# Patient Record
Sex: MALE | Race: WHITE
[De-identification: names, ages, dates, MRNs, and addresses within clinical notes are randomized per-mention and may not be internally consistent; named-entity substitution may affect disease eponyms.]

---

## 2023-07-03 ENCOUNTER — HOSPITAL ENCOUNTER (EMERGENCY)
Age: 38
Discharge: HOME | End: 2023-07-03
Payer: COMMERCIAL

## 2023-07-03 VITALS — BODY MASS INDEX: 28.2 KG/M2

## 2023-07-03 VITALS
TEMPERATURE: 98 F | HEART RATE: 79 BPM | DIASTOLIC BLOOD PRESSURE: 84 MMHG | SYSTOLIC BLOOD PRESSURE: 132 MMHG | RESPIRATION RATE: 20 BRPM | OXYGEN SATURATION: 98 %

## 2023-07-03 DIAGNOSIS — M54.16: Primary | ICD-10-CM

## 2023-07-03 DIAGNOSIS — G89.29: ICD-10-CM

## 2023-07-03 DIAGNOSIS — Z79.899: ICD-10-CM

## 2023-07-03 DIAGNOSIS — M54.50: ICD-10-CM

## 2023-07-03 PROCEDURE — 96372 THER/PROPH/DIAG INJ SC/IM: CPT

## 2023-07-03 PROCEDURE — 99284 EMERGENCY DEPT VISIT MOD MDM: CPT

## 2023-07-03 PROCEDURE — 93971 EXTREMITY STUDY: CPT

## 2023-07-07 ENCOUNTER — HOSPITAL ENCOUNTER (EMERGENCY)
Age: 38
Discharge: HOME | End: 2023-07-07
Payer: COMMERCIAL

## 2023-07-07 VITALS
SYSTOLIC BLOOD PRESSURE: 118 MMHG | DIASTOLIC BLOOD PRESSURE: 76 MMHG | HEART RATE: 93 BPM | OXYGEN SATURATION: 98 % | TEMPERATURE: 98.3 F | RESPIRATION RATE: 18 BRPM

## 2023-07-07 VITALS — BODY MASS INDEX: 38.2 KG/M2

## 2023-07-07 DIAGNOSIS — Z79.4: ICD-10-CM

## 2023-07-07 DIAGNOSIS — M54.16: ICD-10-CM

## 2023-07-07 DIAGNOSIS — Z79.84: ICD-10-CM

## 2023-07-07 DIAGNOSIS — Z87.891: ICD-10-CM

## 2023-07-07 DIAGNOSIS — M54.41: Primary | ICD-10-CM

## 2023-07-07 DIAGNOSIS — Z79.899: ICD-10-CM

## 2023-07-07 PROCEDURE — 96372 THER/PROPH/DIAG INJ SC/IM: CPT

## 2023-07-07 PROCEDURE — 96374 THER/PROPH/DIAG INJ IV PUSH: CPT

## 2023-07-07 PROCEDURE — 99284 EMERGENCY DEPT VISIT MOD MDM: CPT

## 2023-10-11 ENCOUNTER — TELEPHONE (OUTPATIENT)
Dept: FAMILY MEDICINE CLINIC | Age: 38
End: 2023-10-11

## 2023-10-11 ENCOUNTER — OFFICE VISIT (OUTPATIENT)
Dept: FAMILY MEDICINE CLINIC | Age: 38
End: 2023-10-11
Payer: COMMERCIAL

## 2023-10-11 VITALS
DIASTOLIC BLOOD PRESSURE: 58 MMHG | OXYGEN SATURATION: 96 % | SYSTOLIC BLOOD PRESSURE: 100 MMHG | HEART RATE: 72 BPM | WEIGHT: 160.8 LBS | BODY MASS INDEX: 25.84 KG/M2 | HEIGHT: 66 IN | TEMPERATURE: 97.7 F

## 2023-10-11 DIAGNOSIS — M79.10 MYALGIA: ICD-10-CM

## 2023-10-11 DIAGNOSIS — R63.4 ABNORMAL WEIGHT LOSS: ICD-10-CM

## 2023-10-11 DIAGNOSIS — Z79.4 TYPE 2 DIABETES MELLITUS WITH DIABETIC POLYNEUROPATHY, WITH LONG-TERM CURRENT USE OF INSULIN (HCC): ICD-10-CM

## 2023-10-11 DIAGNOSIS — R53.1 GENERALIZED WEAKNESS: ICD-10-CM

## 2023-10-11 DIAGNOSIS — E11.42 TYPE 2 DIABETES MELLITUS WITH DIABETIC POLYNEUROPATHY, WITH LONG-TERM CURRENT USE OF INSULIN (HCC): Primary | ICD-10-CM

## 2023-10-11 DIAGNOSIS — Z79.4 TYPE 2 DIABETES MELLITUS WITH DIABETIC POLYNEUROPATHY, WITH LONG-TERM CURRENT USE OF INSULIN (HCC): Primary | ICD-10-CM

## 2023-10-11 DIAGNOSIS — E11.42 TYPE 2 DIABETES MELLITUS WITH DIABETIC POLYNEUROPATHY, WITH LONG-TERM CURRENT USE OF INSULIN (HCC): ICD-10-CM

## 2023-10-11 LAB
ALBUMIN SERPL-MCNC: 3.4 G/DL (ref 3.5–4.6)
ALP SERPL-CCNC: 116 U/L (ref 35–104)
ALT SERPL-CCNC: 47 U/L (ref 0–41)
ANION GAP SERPL CALCULATED.3IONS-SCNC: 14 MEQ/L (ref 9–15)
AST SERPL-CCNC: 45 U/L (ref 0–40)
B-OH-BUTYR SERPL-SCNC: 1.2 MG/DL (ref 0.2–2.8)
BASOPHILS # BLD: 0 K/UL (ref 0–0.2)
BASOPHILS NFR BLD: 0 %
BILIRUB SERPL-MCNC: 0.3 MG/DL (ref 0.2–0.7)
BUN SERPL-MCNC: 9 MG/DL (ref 6–20)
CALCIUM SERPL-MCNC: 8.2 MG/DL (ref 8.5–9.9)
CHLORIDE SERPL-SCNC: 96 MEQ/L (ref 95–107)
CK SERPL-CCNC: 226 U/L (ref 0–190)
CO2 SERPL-SCNC: 23 MEQ/L (ref 20–31)
CREAT SERPL-MCNC: 0.67 MG/DL (ref 0.7–1.2)
CREAT UR-MCNC: 72.7 MG/DL
CRP SERPL HS-MCNC: <3 MG/L (ref 0–5)
EOSINOPHIL # BLD: 0.1 K/UL (ref 0–0.7)
EOSINOPHIL NFR BLD: 2.6 %
ERYTHROCYTE [DISTWIDTH] IN BLOOD BY AUTOMATED COUNT: 12.6 % (ref 11.5–14.5)
ERYTHROCYTE [SEDIMENTATION RATE] IN BLOOD BY WESTERGREN METHOD: 2 MM (ref 0–10)
GLOBULIN SER CALC-MCNC: 2.5 G/DL (ref 2.3–3.5)
GLUCOSE SERPL-MCNC: 560 MG/DL (ref 70–99)
HBA1C MFR BLD: 14 %
HCT VFR BLD AUTO: 37.3 % (ref 42–52)
HGB BLD-MCNC: 12.4 G/DL (ref 14–18)
LDH SERPL-CCNC: 179 U/L (ref 135–225)
LYMPHOCYTES # BLD: 1.4 K/UL (ref 1–4.8)
LYMPHOCYTES NFR BLD: 25.1 %
MCH RBC QN AUTO: 28.9 PG (ref 27–31.3)
MCHC RBC AUTO-ENTMCNC: 33.2 % (ref 33–37)
MCV RBC AUTO: 86.9 FL (ref 79–92.2)
MICROALBUMIN UR-MCNC: <1.2 MG/DL
MICROALBUMIN/CREAT UR-RTO: NORMAL MG/G (ref 0–30)
MONOCYTES # BLD: 0.5 K/UL (ref 0.2–0.8)
MONOCYTES NFR BLD: 8.6 %
NEUTROPHILS # BLD: 3.5 K/UL (ref 1.4–6.5)
NEUTS SEG NFR BLD: 63.5 %
PLATELET # BLD AUTO: 183 K/UL (ref 130–400)
POTASSIUM SERPL-SCNC: 3.7 MEQ/L (ref 3.4–4.9)
PROT SERPL-MCNC: 5.9 G/DL (ref 6.3–8)
RBC # BLD AUTO: 4.29 M/UL (ref 4.7–6.1)
SODIUM SERPL-SCNC: 133 MEQ/L (ref 135–144)
TSH REFLEX: 0.73 UIU/ML (ref 0.44–3.86)
WBC # BLD AUTO: 5.5 K/UL (ref 4.8–10.8)

## 2023-10-11 PROCEDURE — 83036 HEMOGLOBIN GLYCOSYLATED A1C: CPT | Performed by: FAMILY MEDICINE

## 2023-10-11 PROCEDURE — 3046F HEMOGLOBIN A1C LEVEL >9.0%: CPT | Performed by: FAMILY MEDICINE

## 2023-10-11 PROCEDURE — 99204 OFFICE O/P NEW MOD 45 MIN: CPT | Performed by: FAMILY MEDICINE

## 2023-10-11 RX ORDER — TRAMADOL HYDROCHLORIDE 50 MG/1
50 TABLET ORAL EVERY 6 HOURS PRN
Qty: 28 TABLET | Refills: 0 | Status: SHIPPED | OUTPATIENT
Start: 2023-10-11 | End: 2023-10-18

## 2023-10-11 RX ORDER — PEN NEEDLE, DIABETIC 31 GX3/16"
NEEDLE, DISPOSABLE MISCELLANEOUS
COMMUNITY
Start: 2023-10-05

## 2023-10-11 RX ORDER — SEMAGLUTIDE 1.34 MG/ML
1 INJECTION, SOLUTION SUBCUTANEOUS
COMMUNITY
Start: 2019-11-18

## 2023-10-11 RX ORDER — ATORVASTATIN CALCIUM 40 MG/1
40 TABLET, FILM COATED ORAL
COMMUNITY
Start: 2022-10-06

## 2023-10-11 RX ORDER — GABAPENTIN 300 MG/1
CAPSULE ORAL
COMMUNITY
Start: 2023-09-12

## 2023-10-11 RX ORDER — INSULIN GLARGINE 100 [IU]/ML
INJECTION, SOLUTION SUBCUTANEOUS
COMMUNITY
Start: 2022-10-06

## 2023-10-11 RX ORDER — PROCHLORPERAZINE 25 MG/1
SUPPOSITORY RECTAL
COMMUNITY
Start: 2023-07-31

## 2023-10-11 RX ORDER — LISINOPRIL 5 MG/1
5 TABLET ORAL
COMMUNITY
Start: 2023-05-08

## 2023-10-11 RX ORDER — INSULIN ASPART 100 [IU]/ML
INJECTION, SOLUTION INTRAVENOUS; SUBCUTANEOUS
COMMUNITY
Start: 2023-09-22

## 2023-10-11 RX ORDER — PROCHLORPERAZINE 25 MG/1
SUPPOSITORY RECTAL
COMMUNITY
Start: 2023-09-14

## 2023-10-11 ASSESSMENT — ENCOUNTER SYMPTOMS
ABDOMINAL PAIN: 1
CONSTIPATION: 0
BACK PAIN: 1
BLOOD IN STOOL: 0
SHORTNESS OF BREATH: 0
CHEST TIGHTNESS: 0
RECTAL PAIN: 1

## 2023-10-11 NOTE — PROGRESS NOTES
[facility] DIABETES FOOT EXAM  -     Microalbumin / Creatinine Urine Ratio; Future  -     Lactic Acid; Future  -     Beta-Hydroxybutyrate; Future  2. Abnormal weight loss  -     TSH with Reflex; Future  -     CBC with Auto Differential; Future  -     Comprehensive Metabolic Panel; Future  -     Lactic Acid; Future  -     Lactate Dehydrogenase; Future  -     Sedimentation Rate; Future  -     C-Reactive Protein; Future  -     Beta-Hydroxybutyrate; Future  -     CT ABDOMEN PELVIS W IV CONTRAST Additional Contrast? Oral and Rectal; Future  3. Generalized weakness  -     TSH with Reflex; Future  -     CBC with Auto Differential; Future  -     Comprehensive Metabolic Panel; Future  -     Lactic Acid; Future  -     Lactate Dehydrogenase; Future  -     Sedimentation Rate; Future  -     C-Reactive Protein; Future  -     Beta-Hydroxybutyrate; Future  4. Myalgia  -     CK; Future  -     Aldolase; Future  -     traMADol (ULTRAM) 50 MG tablet; Take 1 tablet by mouth every 6 hours as needed for Pain for up to 7 days. Intended supply: 3 days. Take lowest dose possible to manage pain Max Daily Amount: 200 mg, Disp-28 tablet, R-0Normal  Looking at him there is certainly some concern for major medical issue. He does not want to go into the hospital.  I want him to stop his Lipitor and stop his metformin as I believe he is probably acidotic and there is a possibility of a myositis. I am going to have him split his Lantus to 20 units twice a day instead of 30 units daily. He will continue his lispro and his dose of Ozempic. If his pain gets worse or his urination gets worse he is to go to the ER immediately. His lactic acid or beta hydroxybutyrate are elevated I am going to send him to the hospital directly. Follow-up in 1 week.   Orders Placed This Encounter   Procedures    CT ABDOMEN PELVIS W IV CONTRAST Additional Contrast? Oral and Rectal     Standing Status:   Future     Standing Expiration Date:   10/11/2024     Order Specific

## 2023-10-12 LAB — ALDOLASE SERPL-CCNC: 5.9 U/L (ref 1.2–7.6)

## 2023-10-13 ENCOUNTER — TELEPHONE (OUTPATIENT)
Dept: FAMILY MEDICINE CLINIC | Age: 38
End: 2023-10-13

## 2023-10-13 NOTE — TELEPHONE ENCOUNTER
Amanda, on hipaa, states patient went to the ED (advised by pcp due to lab results) but was sent home after only receiving IV fluids. She is inquiring if he should be reevaluated.

## 2023-10-20 ENCOUNTER — OFFICE VISIT (OUTPATIENT)
Dept: FAMILY MEDICINE CLINIC | Age: 38
End: 2023-10-20
Payer: COMMERCIAL

## 2023-10-20 VITALS
WEIGHT: 177 LBS | SYSTOLIC BLOOD PRESSURE: 110 MMHG | DIASTOLIC BLOOD PRESSURE: 52 MMHG | BODY MASS INDEX: 28.45 KG/M2 | TEMPERATURE: 98.2 F | HEIGHT: 66 IN

## 2023-10-20 DIAGNOSIS — R63.4 ABNORMAL WEIGHT LOSS: ICD-10-CM

## 2023-10-20 DIAGNOSIS — Z79.4 TYPE 2 DIABETES MELLITUS WITH DIABETIC POLYNEUROPATHY, WITH LONG-TERM CURRENT USE OF INSULIN (HCC): ICD-10-CM

## 2023-10-20 DIAGNOSIS — E11.42 TYPE 2 DIABETES MELLITUS WITH DIABETIC POLYNEUROPATHY, WITH LONG-TERM CURRENT USE OF INSULIN (HCC): ICD-10-CM

## 2023-10-20 DIAGNOSIS — M79.10 MYALGIA: Primary | ICD-10-CM

## 2023-10-20 DIAGNOSIS — R53.1 GENERALIZED WEAKNESS: ICD-10-CM

## 2023-10-20 PROCEDURE — 3046F HEMOGLOBIN A1C LEVEL >9.0%: CPT | Performed by: FAMILY MEDICINE

## 2023-10-20 PROCEDURE — 99214 OFFICE O/P EST MOD 30 MIN: CPT | Performed by: FAMILY MEDICINE

## 2023-10-20 PROCEDURE — 96372 THER/PROPH/DIAG INJ SC/IM: CPT | Performed by: FAMILY MEDICINE

## 2023-10-20 RX ORDER — KETOROLAC TROMETHAMINE 30 MG/ML
60 INJECTION, SOLUTION INTRAMUSCULAR; INTRAVENOUS ONCE
Status: COMPLETED | OUTPATIENT
Start: 2023-10-20 | End: 2023-10-20

## 2023-10-20 RX ORDER — BACLOFEN 10 MG/1
10 TABLET ORAL 3 TIMES DAILY
Qty: 30 TABLET | Refills: 1 | Status: SHIPPED | OUTPATIENT
Start: 2023-10-20

## 2023-10-20 RX ADMIN — KETOROLAC TROMETHAMINE 60 MG: 30 INJECTION, SOLUTION INTRAMUSCULAR; INTRAVENOUS at 11:26

## 2023-10-20 SDOH — ECONOMIC STABILITY: FOOD INSECURITY: WITHIN THE PAST 12 MONTHS, THE FOOD YOU BOUGHT JUST DIDN'T LAST AND YOU DIDN'T HAVE MONEY TO GET MORE.: NEVER TRUE

## 2023-10-20 SDOH — ECONOMIC STABILITY: INCOME INSECURITY: HOW HARD IS IT FOR YOU TO PAY FOR THE VERY BASICS LIKE FOOD, HOUSING, MEDICAL CARE, AND HEATING?: NOT HARD AT ALL

## 2023-10-20 SDOH — ECONOMIC STABILITY: HOUSING INSECURITY
IN THE LAST 12 MONTHS, WAS THERE A TIME WHEN YOU DID NOT HAVE A STEADY PLACE TO SLEEP OR SLEPT IN A SHELTER (INCLUDING NOW)?: NO

## 2023-10-20 SDOH — ECONOMIC STABILITY: FOOD INSECURITY: WITHIN THE PAST 12 MONTHS, YOU WORRIED THAT YOUR FOOD WOULD RUN OUT BEFORE YOU GOT MONEY TO BUY MORE.: NEVER TRUE

## 2023-10-20 ASSESSMENT — PATIENT HEALTH QUESTIONNAIRE - PHQ9
SUM OF ALL RESPONSES TO PHQ QUESTIONS 1-9: 0
SUM OF ALL RESPONSES TO PHQ QUESTIONS 1-9: 0
2. FEELING DOWN, DEPRESSED OR HOPELESS: 0
SUM OF ALL RESPONSES TO PHQ9 QUESTIONS 1 & 2: 0
SUM OF ALL RESPONSES TO PHQ QUESTIONS 1-9: 0
SUM OF ALL RESPONSES TO PHQ QUESTIONS 1-9: 0
1. LITTLE INTEREST OR PLEASURE IN DOING THINGS: 0

## 2023-10-20 NOTE — PROGRESS NOTES
normal.      Palpations: Abdomen is soft. Musculoskeletal:         General: Normal range of motion. Cervical back: Normal range of motion and neck supple. Skin:     General: Skin is warm and dry. Neurological:      General: No focal deficit present. Mental Status: He is alert. Psychiatric:         Mood and Affect: Mood normal.         Behavior: Behavior normal.         Thought Content: Thought content normal.         Judgment: Judgment normal.       Assessment & Plan   1. Myalgia  -     TOBI Screen With Reflex; Future  -     CK; Future  -     baclofen (LIORESAL) 10 MG tablet; Take 1 tablet by mouth 3 times daily, Disp-30 tablet, R-1Normal  2. Generalized weakness  -     TOBI Screen With Reflex; Future  -     CK; Future  3. Type 2 diabetes mellitus with diabetic polyneuropathy, with long-term current use of insulin ([address])  4. Abnormal weight loss  Think the CT scan of the abdomen as well. Repeat his CK levels to see if they have returned to normal after stopping the statin. Going to increase his gabapentin to 3 times a day. We will add baclofen to see if we get him a little pain relief. If his CK levels are elevated I am going to send him to neurology to proceed with further work-up. The etiology of his pain is unclear to me at this point in time similarly the weight loss which could be due to to the uncontrolled blood sugars but will continue close follow-up. Orders Placed This Encounter   Procedures    TOBI Screen With Reflex     Standing Status:   Future     Number of Occurrences:   1     Standing Expiration Date:   10/20/2024    CK     Standing Status:   Future     Number of Occurrences:   1     Standing Expiration Date:   10/20/2024     Orders Placed This Encounter   Medications    baclofen (LIORESAL) 10 MG tablet     Sig: Take 1 tablet by mouth 3 times daily     Dispense:  30 tablet     Refill:  1     There are no discontinued medications.   Return in about 1 week (around

## 2023-10-26 ENCOUNTER — TELEMEDICINE (OUTPATIENT)
Dept: FAMILY MEDICINE CLINIC | Age: 38
End: 2023-10-26
Payer: COMMERCIAL

## 2023-10-26 DIAGNOSIS — M79.10 MYALGIA: ICD-10-CM

## 2023-10-26 DIAGNOSIS — R74.8 ABNORMAL CK: ICD-10-CM

## 2023-10-26 DIAGNOSIS — R76.8 ELEVATED ANTINUCLEAR ANTIBODY (ANA) LEVEL: Primary | ICD-10-CM

## 2023-10-26 PROCEDURE — 99423 OL DIG E/M SVC 21+ MIN: CPT | Performed by: FAMILY MEDICINE

## 2023-10-26 RX ORDER — PREGABALIN 50 MG/1
50 CAPSULE ORAL 3 TIMES DAILY
Qty: 90 CAPSULE | Refills: 0 | Status: SHIPPED | OUTPATIENT
Start: 2023-10-26 | End: 2023-11-25

## 2023-10-26 NOTE — PROGRESS NOTES
Veronica Austin, was evaluated through a synchronous (real-time) audio-video encounter. The patient (or guardian if applicable) is aware that this is a billable service, which includes applicable co-pays. This Virtual Visit was conducted with patient's (and/or legal guardian's) consent. Patient identification was verified, and a caregiver was present when appropriate. The patient was located at Home: [address] [address]  Provider was located at [address] [place] [address] ([address] [place] [address]): Ki Reece (:  1985) is a Established patient, presenting virtually for evaluation of the following:    Assessment & Plan   Below is the assessment and plan developed based on review of pertinent history, physical exam, labs, studies, and medications. 1. Elevated antinuclear antibody (TOBI) level  -     pregabalin (LYRICA) 50 MG capsule; Take 1 capsule by mouth 3 times daily for 30 days. Max Daily Amount: 150 mg, Disp-90 capsule, R-0Normal  -     Amb External Referral To Rheumatology  2. Myalgia  -     pregabalin (LYRICA) 50 MG capsule; Take 1 capsule by mouth 3 times daily for 30 days. Max Daily Amount: 150 mg, Disp-90 capsule, R-0Normal  3. Abnormal CK  -     pregabalin (LYRICA) 50 MG capsule; Take 1 capsule by mouth 3 times daily for 30 days. Max Daily Amount: 150 mg, Disp-90 capsule, R-0Normal  -     Amb External Referral To Rheumatology    Return in about 4 weeks (around 2023). Subjective   HPI  Review of Systems   He has not noticed much change. The diffuse pain is unchanged. The baclofen did not help at all. Tramadol was not effective. Labs reviewed and his CKs have gone up. His TOBI was borderline positive. His blood sugars are getting better with the insulin changes. He is having more pain in his low back and down his leg but the total muscle pain is just as bad.     Objective   Patient-Reported Vitals  No data recorded     Physical Exam  [INSTRUCTIONS:  \"[x]\" Indicates a positive item

## 2023-11-17 DIAGNOSIS — M79.10 MYALGIA: ICD-10-CM

## 2023-11-17 RX ORDER — BACLOFEN 10 MG/1
10 TABLET ORAL 3 TIMES DAILY
Qty: 30 TABLET | Refills: 1 | Status: SHIPPED | OUTPATIENT
Start: 2023-11-17

## 2023-11-17 NOTE — TELEPHONE ENCOUNTER
Future Appointments    This patient does not currently have any appointments scheduled.   Past Visits    Date Provider Specialty Visit Type Primary Dx   10/26/2023 Ramon Cannon DO Family Medicine Telemedicine Elevated antinuclear antibody (TOBI) level

## 2023-12-13 DIAGNOSIS — M79.10 MYALGIA: ICD-10-CM

## 2023-12-13 RX ORDER — BACLOFEN 10 MG/1
10 TABLET ORAL 3 TIMES DAILY
Qty: 30 TABLET | Refills: 1 | Status: SHIPPED | OUTPATIENT
Start: 2023-12-13

## 2023-12-13 NOTE — TELEPHONE ENCOUNTER
Future Appointments    This patient does not currently have any appointments scheduled.   Past Visits    Date Provider Specialty Visit Type Primary Dx   10/26/2023 Divina Lopez DO Family Medicine Telemedicine Elevated antinuclear antibody (TOBI) level

## 2024-01-05 RX ORDER — INSULIN GLARGINE 100 [IU]/ML
INJECTION, SOLUTION SUBCUTANEOUS
Qty: 5 ADJUSTABLE DOSE PRE-FILLED PEN SYRINGE | Refills: 1 | Status: SHIPPED | OUTPATIENT
Start: 2024-01-05

## 2024-01-05 NOTE — TELEPHONE ENCOUNTER
Future Appointments    This patient does not currently have any appointments scheduled.  Past Visits    Date Provider Specialty Visit Type Primary Dx   10/26/2023 Momo Molina, DO Family Medicine Telemedicine Elevated antinuclear antibody (TOBI) level

## 2024-01-06 DIAGNOSIS — M79.10 MYALGIA: ICD-10-CM

## 2024-01-08 RX ORDER — BACLOFEN 10 MG/1
10 TABLET ORAL 3 TIMES DAILY
Qty: 30 TABLET | Refills: 1 | Status: SHIPPED | OUTPATIENT
Start: 2024-01-08

## 2024-02-01 ENCOUNTER — OFFICE VISIT (OUTPATIENT)
Dept: NEUROSURGERY | Facility: CLINIC | Age: 39
End: 2024-02-01
Payer: COMMERCIAL

## 2024-02-01 VITALS
SYSTOLIC BLOOD PRESSURE: 148 MMHG | BODY MASS INDEX: 28.93 KG/M2 | HEIGHT: 66 IN | RESPIRATION RATE: 20 BRPM | HEART RATE: 84 BPM | WEIGHT: 180 LBS | DIASTOLIC BLOOD PRESSURE: 87 MMHG

## 2024-02-01 DIAGNOSIS — M51.36 DDD (DEGENERATIVE DISC DISEASE), LUMBAR: Primary | ICD-10-CM

## 2024-02-01 DIAGNOSIS — M79.10 MYALGIA: ICD-10-CM

## 2024-02-01 PROCEDURE — 1036F TOBACCO NON-USER: CPT | Performed by: NEUROLOGICAL SURGERY

## 2024-02-01 PROCEDURE — 99214 OFFICE O/P EST MOD 30 MIN: CPT | Performed by: NEUROLOGICAL SURGERY

## 2024-02-01 PROCEDURE — 99204 OFFICE O/P NEW MOD 45 MIN: CPT | Performed by: NEUROLOGICAL SURGERY

## 2024-02-01 RX ORDER — LISINOPRIL 5 MG/1
5 TABLET ORAL
COMMUNITY
Start: 2023-05-08

## 2024-02-01 RX ORDER — NAPROXEN 500 MG/1
TABLET ORAL
COMMUNITY

## 2024-02-01 RX ORDER — ALOGLIPTIN 25 MG/1
25 TABLET, FILM COATED ORAL
COMMUNITY
Start: 2017-10-31

## 2024-02-01 RX ORDER — SEMAGLUTIDE 1.34 MG/ML
INJECTION, SOLUTION SUBCUTANEOUS
COMMUNITY
Start: 2023-06-06

## 2024-02-01 RX ORDER — GABAPENTIN 600 MG/1
TABLET ORAL
COMMUNITY
Start: 2023-12-30

## 2024-02-01 RX ORDER — BACLOFEN 10 MG/1
10 TABLET ORAL 3 TIMES DAILY
Qty: 30 TABLET | Refills: 1 | Status: SHIPPED | OUTPATIENT
Start: 2024-02-01

## 2024-02-01 RX ORDER — BLOOD-GLUCOSE SENSOR
EACH MISCELLANEOUS
COMMUNITY
Start: 2023-07-31

## 2024-02-01 ASSESSMENT — PAIN SCALES - GENERAL: PAINLEVEL: 8

## 2024-02-01 NOTE — PROGRESS NOTES
It was a pleasure to see Mr. Blackwood at the Neurosurgery Spine Clinic at [facility].     He is a really nice 38 y.o. male  who presents to us with complaints LOW BACK PAIN radiating to RIGHT LEG that started about  7 months ago, and have been gradually worsening since that time.  The symptoms started after no known injury    The pain is 10 /10. The pain is described as aching and occurs all day.  Symptoms are exacerbated by nothing in particular. Factors which relieve the pain include nothing      Numbness and/or tingling - YES    Weakness : YES    Bladder/Bowel symptoms - NO    The patient has tried medications (eg: gabapentin, NSAIDS and narcotics ) : Yes  PT : Yes    Date: last was last year  Pain Management with ESIs/selective nerve blocks  - NO    he is a NON-SMOKER and DIABETIC    History of Depression : NO    PRIOR SPINE SURGERY: NO    Denies use of Aspirin, Coumadin, or Plavix or any other anticoagulants     NARCOTICS for pain : YES    Part of this patient’s history is from personal review of the patient’s previous charts.      History reviewed. No pertinent past medical history.        Current Outpatient Medications:     alogliptin (Nesina) 25 mg tablet, 1 tablet (25 mg)., Disp: , Rfl:     Dexcom G6 Sensor device, use as directed CHANGE EVERY 10 DAYS, Disp: , Rfl:     gabapentin (Neurontin) 600 mg tablet, , Disp: , Rfl:     lisinopril 5 mg tablet, Take 1 tablet (5 mg) by mouth., Disp: , Rfl:     Ozempic 1 mg/dose (4 mg/3 mL) pen injector, Inject under the skin., Disp: , Rfl:     naproxen (Naprosyn) 500 mg tablet, take 1 tablet by mouth twice a day if needed pain, Disp: , Rfl:       Review of Systems :   Constitutional: No fever or chills  Respiratory: No shortness of breath or wheezing  Musculoskeletal: see HPI above   Neurologic: See HPI above        EXAM:   Vitals:    02/01/24 1042   BP: 148/87   Pulse: 84   Resp: 20     NEURO: Neurologically patient is awake alert and oriented X 3    No obvious  cranial nerve deficit.  Strength is almost 5/5 throughout all motor groups tested with no asymmetric significant motor deficit.  Deep tendon reflexes are symmetric throughout.   Gait : Antalgic.  Sensory examination is intact to touch and painful stimuli.  SLR negative      IMAGING:   MRI of the lumbar spine was reviewed personally and shows presence of slight loss of disc height at L4-5 with a broad-based protrusion resulting in lateral recess and moderate to medial neural foraminal stenosis     ASSESSMENT AND PLAN:  Maldonado Blackwood is a 38 y.o. male presents to us with a few months of symptoms of pain involving mainly the medial part of the thigh that are hemolyzed there is in the groin near his testes and radiates that involve mostly the thigh region.  He does have some pain involving the back and in the lower extremities that could be suggestive of right-sided L4/L5 radiculopathy given the fact that he does have mild to moderate stenosis involving the lateral recess and medial foramen at L4-5 but overall he seems to be significantly more symptomatic with the pain involving the groin medial thigh that does not seems to be explained by the right L4-5 lateral recess stenosis that he has and could be unrelated to the finding of the MRI of the spine and may be arising from some other pathology involving the groin region or abdomen.  He denies any bowel bladder incontinence.  He has tried physical therapy with no relief.  I discussed with him the finding of his MRI and do not recommend any surgical intervention given the lack of correlation between the symptoms that seems to be present mainly in the medial aspect of his thigh and groin that does not correlate very well with the right L4-5 stenosis and it is very likely that he does have some pain from the right L4-5 stenosis but his major symptom that he seems to be worried about the loss may be unrelated to the findings of the spine MRI.  Discussed with him though  discuss symptoms with his primary Dr. Stephon Barragan and see if he would benefit from some further workup to rule out any other etiology of his ongoing symptoms.   Regarding history symptoms of radiculopathy that seems to be also present even though not significantly worse than the medial thigh symptoms I believe we will benefit from continued physical therapy and pain management evaluation and treatment but the patient is adamantly not interested in any procedures or injections due to some family members not doing well with the same whatsoever.  Regardless I do not recommend any surgical intervention at this point unless there is absolutely no other etiology for his ongoing symptoms that could be explained by any further evaluation of the abdomen or urogenital system.    It was a pleasure to participate in Mr. Blackwood clinical care. All questions were answered to him satisfaction and he explained understanding of the further treatment plan.         Omer Bates MD, [facility]   of Neurological Surgery  [employer]  Attending Surgeon  Director - Minimally Invasive Spine Surgery  [place], [place]      Some of this note was completed using Dragon voice recognition technology and sometimes the software misinterprets words. This may include unintended errors with respect to translation of words, typographical errors or grammar errors which may not have been identified prior to finalization of the chart note. Please take this into account when reading this note

## 2024-02-08 ENCOUNTER — OFFICE VISIT (OUTPATIENT)
Dept: FAMILY MEDICINE CLINIC | Age: 39
End: 2024-02-08
Payer: COMMERCIAL

## 2024-02-08 VITALS
DIASTOLIC BLOOD PRESSURE: 90 MMHG | BODY MASS INDEX: 31.05 KG/M2 | SYSTOLIC BLOOD PRESSURE: 144 MMHG | HEIGHT: 66 IN | OXYGEN SATURATION: 97 % | TEMPERATURE: 98.7 F | HEART RATE: 81 BPM | WEIGHT: 193.2 LBS

## 2024-02-08 DIAGNOSIS — E10.65 UNCONTROLLED TYPE 1 DIABETES MELLITUS WITH HYPERGLYCEMIA, WITH LONG-TERM CURRENT USE OF INSULIN (HCC): ICD-10-CM

## 2024-02-08 DIAGNOSIS — M79.10 MYALGIA: ICD-10-CM

## 2024-02-08 DIAGNOSIS — R76.8 POSITIVE ANA (ANTINUCLEAR ANTIBODY): ICD-10-CM

## 2024-02-08 DIAGNOSIS — G47.01 INSOMNIA DUE TO MEDICAL CONDITION: Primary | ICD-10-CM

## 2024-02-08 LAB
ALBUMIN SERPL-MCNC: 4.2 G/DL (ref 3.5–4.6)
ALP SERPL-CCNC: 125 U/L (ref 35–104)
ALT SERPL-CCNC: 25 U/L (ref 0–41)
ANION GAP SERPL CALCULATED.3IONS-SCNC: 9 MEQ/L (ref 9–15)
AST SERPL-CCNC: 25 U/L (ref 0–40)
BILIRUB SERPL-MCNC: 0.3 MG/DL (ref 0.2–0.7)
BUN SERPL-MCNC: 8 MG/DL (ref 6–20)
CALCIUM SERPL-MCNC: 9.2 MG/DL (ref 8.5–9.9)
CHLORIDE SERPL-SCNC: 105 MEQ/L (ref 95–107)
CO2 SERPL-SCNC: 26 MEQ/L (ref 20–31)
CREAT SERPL-MCNC: 0.68 MG/DL (ref 0.7–1.2)
GLOBULIN SER CALC-MCNC: 2.9 G/DL (ref 2.3–3.5)
GLUCOSE SERPL-MCNC: 99 MG/DL (ref 70–99)
HBA1C MFR BLD: 13.1 %
POTASSIUM SERPL-SCNC: 4.1 MEQ/L (ref 3.4–4.9)
PROT SERPL-MCNC: 7.1 G/DL (ref 6.3–8)
SODIUM SERPL-SCNC: 140 MEQ/L (ref 135–144)

## 2024-02-08 PROCEDURE — G8427 DOCREV CUR MEDS BY ELIG CLIN: HCPCS | Performed by: FAMILY MEDICINE

## 2024-02-08 PROCEDURE — G8417 CALC BMI ABV UP PARAM F/U: HCPCS | Performed by: FAMILY MEDICINE

## 2024-02-08 PROCEDURE — 3046F HEMOGLOBIN A1C LEVEL >9.0%: CPT | Performed by: FAMILY MEDICINE

## 2024-02-08 PROCEDURE — 99214 OFFICE O/P EST MOD 30 MIN: CPT | Performed by: FAMILY MEDICINE

## 2024-02-08 PROCEDURE — 1036F TOBACCO NON-USER: CPT | Performed by: FAMILY MEDICINE

## 2024-02-08 PROCEDURE — 2022F DILAT RTA XM EVC RTNOPTHY: CPT | Performed by: FAMILY MEDICINE

## 2024-02-08 PROCEDURE — G8484 FLU IMMUNIZE NO ADMIN: HCPCS | Performed by: FAMILY MEDICINE

## 2024-02-08 PROCEDURE — 83036 HEMOGLOBIN GLYCOSYLATED A1C: CPT | Performed by: FAMILY MEDICINE

## 2024-02-08 RX ORDER — INSULIN LISPRO 100 [IU]/ML
12 INJECTION, SOLUTION INTRAVENOUS; SUBCUTANEOUS 4 TIMES DAILY
Qty: 3 ADJUSTABLE DOSE PRE-FILLED PEN SYRINGE | Refills: 3 | Status: SHIPPED | OUTPATIENT
Start: 2024-02-08

## 2024-02-08 RX ORDER — CLONIDINE HYDROCHLORIDE 0.1 MG/1
0.1 TABLET ORAL
Qty: 30 TABLET | Refills: 3 | Status: SHIPPED | OUTPATIENT
Start: 2024-02-08

## 2024-02-08 RX ORDER — DOXEPIN HYDROCHLORIDE 25 MG/1
25 CAPSULE ORAL NIGHTLY
Qty: 30 CAPSULE | Refills: 3 | Status: SHIPPED | OUTPATIENT
Start: 2024-02-08

## 2024-02-08 ASSESSMENT — PATIENT HEALTH QUESTIONNAIRE - PHQ9
SUM OF ALL RESPONSES TO PHQ QUESTIONS 1-9: 0
2. FEELING DOWN, DEPRESSED OR HOPELESS: 0
1. LITTLE INTEREST OR PLEASURE IN DOING THINGS: 0
SUM OF ALL RESPONSES TO PHQ9 QUESTIONS 1 & 2: 0
SUM OF ALL RESPONSES TO PHQ QUESTIONS 1-9: 0

## 2024-02-08 NOTE — PROGRESS NOTES
Subjective  Kevin Garza 1985 is a 38 y.o. male who presents today with:  Chief Complaint   Patient presents with    Abdominal Pain     States for the past 2 months, he has been having generalized abdominal pain, stomach cramping, bloating, and excessive gas after injecting himself with Novolog. Currently using the Novolog 3-4 times daily. Symptoms start about 5-10 minutes after he injects. Denies nausea, heartburn, vomiting, or diarrhea. Blood sugars running between 180-225.     Insomnia     C/O difficulty with both falling and staying asleep. Currently estimates that he is getting about 2-3 hours of sleep nightly. He does wake up gasping for air in the middle of night. His wife states he snores as well. States he is extremely fatigued at all hours of the day. He has had a previous sleep study 12-13 years ago at [facility]. He has tried taking Melatonin and Tylenol PM without effectiveness.        HPI  I have reviewed HPI and agree with above.  He was in the ER at [facility] towards the end of January.  He was in DKA but left AGAINST MEDICAL ADVICE.  He continues to feel horribly.  His diabetes is very poorly controlled compliance is an issue.  He is unable to take the NovoLog due to stomach cramping and excessive gas.  Today's A1c is 13.    Review of Systems   All other systems reviewed and are negative.      History reviewed. No pertinent past medical history.  History reviewed. No pertinent surgical history.  Social History     Socioeconomic History    Marital status: Single     Spouse name: Not on file    Number of children: Not on file    Years of education: Not on file    Highest education level: Not on file   Occupational History    Not on file   Tobacco Use    Smoking status: Never    Smokeless tobacco: Never   Vaping Use    Vaping Use: Never used   Substance and Sexual Activity    Alcohol use: Never    Drug use: Never    Sexual activity: Not on file   Other Topics Concern    Not on file   Social

## 2024-02-21 ENCOUNTER — OFFICE VISIT (OUTPATIENT)
Dept: FAMILY MEDICINE CLINIC | Age: 39
End: 2024-02-21
Payer: COMMERCIAL

## 2024-02-21 VITALS
HEART RATE: 69 BPM | OXYGEN SATURATION: 98 % | BODY MASS INDEX: 32.6 KG/M2 | DIASTOLIC BLOOD PRESSURE: 68 MMHG | TEMPERATURE: 97.8 F | WEIGHT: 202 LBS | SYSTOLIC BLOOD PRESSURE: 114 MMHG

## 2024-02-21 DIAGNOSIS — G47.01 INSOMNIA DUE TO MEDICAL CONDITION: ICD-10-CM

## 2024-02-21 DIAGNOSIS — R11.0 NAUSEA: ICD-10-CM

## 2024-02-21 DIAGNOSIS — T50.905A ADVERSE EFFECT OF DRUG, INITIAL ENCOUNTER: Primary | ICD-10-CM

## 2024-02-21 DIAGNOSIS — L29.9 ITCHING: ICD-10-CM

## 2024-02-21 PROCEDURE — G8484 FLU IMMUNIZE NO ADMIN: HCPCS | Performed by: FAMILY MEDICINE

## 2024-02-21 PROCEDURE — 96372 THER/PROPH/DIAG INJ SC/IM: CPT | Performed by: FAMILY MEDICINE

## 2024-02-21 PROCEDURE — G8417 CALC BMI ABV UP PARAM F/U: HCPCS | Performed by: FAMILY MEDICINE

## 2024-02-21 PROCEDURE — 1036F TOBACCO NON-USER: CPT | Performed by: FAMILY MEDICINE

## 2024-02-21 PROCEDURE — 99214 OFFICE O/P EST MOD 30 MIN: CPT | Performed by: FAMILY MEDICINE

## 2024-02-21 PROCEDURE — G8427 DOCREV CUR MEDS BY ELIG CLIN: HCPCS | Performed by: FAMILY MEDICINE

## 2024-02-21 RX ORDER — DEXAMETHASONE SODIUM PHOSPHATE 10 MG/ML
10 INJECTION INTRAMUSCULAR; INTRAVENOUS ONCE
Status: COMPLETED | OUTPATIENT
Start: 2024-02-21 | End: 2024-02-21

## 2024-02-21 RX ORDER — QUETIAPINE FUMARATE 50 MG/1
50 TABLET, FILM COATED ORAL 2 TIMES DAILY
Qty: 60 TABLET | Refills: 3 | Status: SHIPPED | OUTPATIENT
Start: 2024-02-21 | End: 2024-02-21 | Stop reason: SDUPTHER

## 2024-02-21 RX ORDER — PROMETHAZINE HYDROCHLORIDE 25 MG/ML
25 INJECTION, SOLUTION INTRAMUSCULAR; INTRAVENOUS ONCE
Status: COMPLETED | OUTPATIENT
Start: 2024-02-21 | End: 2024-02-21

## 2024-02-21 RX ORDER — HYDROXYZINE HYDROCHLORIDE 25 MG/1
TABLET, FILM COATED ORAL
COMMUNITY
Start: 2024-02-19

## 2024-02-21 RX ORDER — PREDNISONE 20 MG/1
TABLET ORAL
COMMUNITY
Start: 2024-02-19

## 2024-02-21 RX ORDER — QUETIAPINE FUMARATE 50 MG/1
50 TABLET, FILM COATED ORAL NIGHTLY
Qty: 30 TABLET | Refills: 2 | Status: SHIPPED | OUTPATIENT
Start: 2024-02-21

## 2024-02-21 RX ADMIN — PROMETHAZINE HYDROCHLORIDE 25 MG: 25 INJECTION, SOLUTION INTRAMUSCULAR; INTRAVENOUS at 09:15

## 2024-02-21 RX ADMIN — DEXAMETHASONE SODIUM PHOSPHATE 10 MG: 10 INJECTION INTRAMUSCULAR; INTRAVENOUS at 09:15

## 2024-02-21 NOTE — PROGRESS NOTES
Mental Status: He is alert.   Psychiatric:         Mood and Affect: Mood normal.         Behavior: Behavior normal.         Thought Content: Thought content normal.         Judgment: Judgment normal.     Assessment & Plan   1. Adverse effect of drug, initial encounter-I suspect this is a reaction to the doxepin.  I am going to stop both the doxepin and the clonidine.  He will finish the prednisone again given Decadron here in the office.      Something happened to the note.  Documentation was lost.  Orders were placed for 2 injections.  The new prescription was sent.  He has close follow-up.

## 2024-03-03 DIAGNOSIS — E10.65 UNCONTROLLED TYPE 1 DIABETES MELLITUS WITH HYPERGLYCEMIA, WITH LONG-TERM CURRENT USE OF INSULIN (HCC): ICD-10-CM

## 2024-03-04 RX ORDER — INSULIN LISPRO 100 [IU]/ML
INJECTION, SOLUTION INTRAVENOUS; SUBCUTANEOUS
Qty: 3 ADJUSTABLE DOSE PRE-FILLED PEN SYRINGE | Refills: 3 | Status: SHIPPED | OUTPATIENT
Start: 2024-03-04

## 2024-03-15 DIAGNOSIS — F41.9 ANXIETY: Primary | ICD-10-CM

## 2024-04-04 ENCOUNTER — OFFICE VISIT (OUTPATIENT)
Dept: FAMILY MEDICINE CLINIC | Age: 39
End: 2024-04-04
Payer: COMMERCIAL

## 2024-04-04 VITALS
SYSTOLIC BLOOD PRESSURE: 136 MMHG | OXYGEN SATURATION: 96 % | TEMPERATURE: 97.8 F | DIASTOLIC BLOOD PRESSURE: 80 MMHG | BODY MASS INDEX: 34.01 KG/M2 | HEIGHT: 66 IN | HEART RATE: 96 BPM | WEIGHT: 211.6 LBS

## 2024-04-04 DIAGNOSIS — M79.10 MYALGIA: ICD-10-CM

## 2024-04-04 DIAGNOSIS — M54.17 LUMBOSACRAL RADICULOPATHY: ICD-10-CM

## 2024-04-04 DIAGNOSIS — E10.65 UNCONTROLLED TYPE 1 DIABETES MELLITUS WITH HYPERGLYCEMIA, WITH LONG-TERM CURRENT USE OF INSULIN (HCC): Primary | ICD-10-CM

## 2024-04-04 DIAGNOSIS — G89.29 CHRONIC NEUROPATHIC PAIN: ICD-10-CM

## 2024-04-04 DIAGNOSIS — M79.2 CHRONIC NEUROPATHIC PAIN: ICD-10-CM

## 2024-04-04 DIAGNOSIS — R74.8 ABNORMAL CK: ICD-10-CM

## 2024-04-04 DIAGNOSIS — G47.01 INSOMNIA DUE TO MEDICAL CONDITION: ICD-10-CM

## 2024-04-04 DIAGNOSIS — R76.8 ELEVATED ANTINUCLEAR ANTIBODY (ANA) LEVEL: ICD-10-CM

## 2024-04-04 DIAGNOSIS — G25.81 RESTLESS LEGS: ICD-10-CM

## 2024-04-04 PROCEDURE — 2022F DILAT RTA XM EVC RTNOPTHY: CPT | Performed by: FAMILY MEDICINE

## 2024-04-04 PROCEDURE — G8417 CALC BMI ABV UP PARAM F/U: HCPCS | Performed by: FAMILY MEDICINE

## 2024-04-04 PROCEDURE — 1036F TOBACCO NON-USER: CPT | Performed by: FAMILY MEDICINE

## 2024-04-04 PROCEDURE — G8427 DOCREV CUR MEDS BY ELIG CLIN: HCPCS | Performed by: FAMILY MEDICINE

## 2024-04-04 PROCEDURE — 3046F HEMOGLOBIN A1C LEVEL >9.0%: CPT | Performed by: FAMILY MEDICINE

## 2024-04-04 PROCEDURE — 99214 OFFICE O/P EST MOD 30 MIN: CPT | Performed by: FAMILY MEDICINE

## 2024-04-04 RX ORDER — PREGABALIN 100 MG/1
100 CAPSULE ORAL 3 TIMES DAILY
Qty: 90 CAPSULE | Refills: 1 | Status: SHIPPED | OUTPATIENT
Start: 2024-04-04 | End: 2024-06-03

## 2024-04-04 RX ORDER — QUETIAPINE FUMARATE 300 MG/1
300 TABLET, FILM COATED ORAL NIGHTLY
Qty: 30 TABLET | Refills: 2 | Status: SHIPPED | OUTPATIENT
Start: 2024-04-04

## 2024-04-04 RX ORDER — ROPINIROLE 2 MG/1
2 TABLET, FILM COATED ORAL 3 TIMES DAILY
Qty: 90 TABLET | Refills: 3 | Status: SHIPPED | OUTPATIENT
Start: 2024-04-04

## 2024-04-04 RX ORDER — INSULIN GLARGINE 100 [IU]/ML
INJECTION, SOLUTION SUBCUTANEOUS
Qty: 5 ADJUSTABLE DOSE PRE-FILLED PEN SYRINGE | Refills: 1 | Status: SHIPPED | OUTPATIENT
Start: 2024-04-04

## 2024-04-04 NOTE — PROGRESS NOTES
Subjective  Kevin Garza 1985 is a 39 y.o. male who presents today with:  Chief Complaint   Patient presents with    Depression     C/O depression and anxiety since getting out of senior care. He is scheduled to see Eden De La Cruz CNP at the end of the month. States he is constantly feeling nervous as he does not know if he is going back to senior care. Does not want to do anything - no motivation, some of this stemming from the pain in his legs/feet.     Leg Pain     C/O numb and tingling pain in his legs and feet bilaterally. Blood sugars have been over 300 during the day and 170 at night before bed. He has not been making good diet choices. He is scheduled to see rheumatology at the end of the month.        Depression  Leg Pain       I have reviewed HPI and agree with above.  The numbness and the tingling make it difficult for him to control his leg movements.  He is not able to sleep.  He has a lot of life space stress going on.  As it turns out the only thing that corrects the pain is when he takes Percocet.  He has been taking Percocet tens 3 times a day that he is gone from other sources.  He states when he takes this he has no pain at all and is able to function normally.    He continues to not eat well.  His postmeal blood sugars are in the 300s.  He did have a discussion with someone about a pump but his C-peptide was still too high.    Review of Systems   Psychiatric/Behavioral:  Positive for depression.    All other systems reviewed and are negative.      History reviewed. No pertinent past medical history.  History reviewed. No pertinent surgical history.  Social History     Socioeconomic History    Marital status: Single     Spouse name: Not on file    Number of children: Not on file    Years of education: Not on file    Highest education level: Not on file   Occupational History    Not on file   Tobacco Use    Smoking status: Never    Smokeless tobacco: Never   Vaping Use    Vaping Use: Never used

## 2024-04-08 DIAGNOSIS — E10.65 UNCONTROLLED TYPE 1 DIABETES MELLITUS WITH HYPERGLYCEMIA, WITH LONG-TERM CURRENT USE OF INSULIN (HCC): ICD-10-CM

## 2024-04-08 RX ORDER — INSULIN LISPRO 100 [IU]/ML
INJECTION, SOLUTION INTRAVENOUS; SUBCUTANEOUS
Qty: 3 ML | Refills: 5 | Status: SHIPPED | OUTPATIENT
Start: 2024-04-08

## 2024-04-11 ENCOUNTER — HOSPITAL ENCOUNTER (EMERGENCY)
Age: 39
Discharge: HOME | End: 2024-04-11
Payer: COMMERCIAL

## 2024-04-11 VITALS
SYSTOLIC BLOOD PRESSURE: 144 MMHG | DIASTOLIC BLOOD PRESSURE: 100 MMHG | TEMPERATURE: 98.24 F | OXYGEN SATURATION: 96 % | HEART RATE: 91 BPM

## 2024-04-11 VITALS — HEART RATE: 74 BPM | DIASTOLIC BLOOD PRESSURE: 100 MMHG | SYSTOLIC BLOOD PRESSURE: 170 MMHG | OXYGEN SATURATION: 94 %

## 2024-04-11 VITALS — BODY MASS INDEX: 32 KG/M2

## 2024-04-11 DIAGNOSIS — S09.8XXA: ICD-10-CM

## 2024-04-11 DIAGNOSIS — Z79.899: ICD-10-CM

## 2024-04-11 DIAGNOSIS — Z87.891: ICD-10-CM

## 2024-04-11 DIAGNOSIS — Z79.84: ICD-10-CM
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
## 2024-04-11 DIAGNOSIS — Z79.4: ICD-10-CM

## 2024-04-11 DIAGNOSIS — R51.9: Primary | ICD-10-CM

## 2024-04-11 PROCEDURE — 70450 CT HEAD/BRAIN W/O DYE: CPT

## 2024-04-11 PROCEDURE — 99284 EMERGENCY DEPT VISIT MOD MDM: CPT

## 2024-04-15 ENCOUNTER — TELEPHONE (OUTPATIENT)
Dept: DIABETES SERVICES | Age: 39
End: 2024-04-15

## 2024-05-11 DIAGNOSIS — G47.01 INSOMNIA DUE TO MEDICAL CONDITION: ICD-10-CM

## 2024-05-11 NOTE — TELEPHONE ENCOUNTER
Pharmacy requesting medication refill. Please approve or deny this request.    Rx requested:  Requested Prescriptions     Pending Prescriptions Disp Refills    QUEtiapine (SEROQUEL) 50 MG tablet [Pharmacy Med Name: QUETIAPINE FUMARATE 50 MG TAB] 30 tablet 2     Sig: take 1 tablet by mouth at bedtime         Last Office Visit:   4/4/2024      Next Visit Date:  No future appointments.

## 2024-05-13 RX ORDER — QUETIAPINE FUMARATE 50 MG/1
50 TABLET, FILM COATED ORAL NIGHTLY
Qty: 30 TABLET | Refills: 2 | Status: SHIPPED | OUTPATIENT
Start: 2024-05-13

## 2024-06-05 ENCOUNTER — OFFICE VISIT (OUTPATIENT)
Dept: FAMILY MEDICINE CLINIC | Age: 39
End: 2024-06-05
Payer: COMMERCIAL

## 2024-06-05 VITALS
SYSTOLIC BLOOD PRESSURE: 126 MMHG | WEIGHT: 211 LBS | TEMPERATURE: 97.9 F | OXYGEN SATURATION: 96 % | HEIGHT: 66 IN | BODY MASS INDEX: 33.91 KG/M2 | HEART RATE: 75 BPM | DIASTOLIC BLOOD PRESSURE: 82 MMHG

## 2024-06-05 DIAGNOSIS — E10.65 UNCONTROLLED TYPE 1 DIABETES MELLITUS WITH HYPERGLYCEMIA, WITH LONG-TERM CURRENT USE OF INSULIN (HCC): ICD-10-CM

## 2024-06-05 PROCEDURE — 99213 OFFICE O/P EST LOW 20 MIN: CPT | Performed by: FAMILY MEDICINE

## 2024-06-05 PROCEDURE — 2022F DILAT RTA XM EVC RTNOPTHY: CPT | Performed by: FAMILY MEDICINE

## 2024-06-05 PROCEDURE — 3046F HEMOGLOBIN A1C LEVEL >9.0%: CPT | Performed by: FAMILY MEDICINE

## 2024-06-05 PROCEDURE — G8427 DOCREV CUR MEDS BY ELIG CLIN: HCPCS | Performed by: FAMILY MEDICINE

## 2024-06-05 PROCEDURE — 1036F TOBACCO NON-USER: CPT | Performed by: FAMILY MEDICINE

## 2024-06-05 PROCEDURE — G8417 CALC BMI ABV UP PARAM F/U: HCPCS | Performed by: FAMILY MEDICINE

## 2024-06-05 RX ORDER — INSULIN LISPRO 100 [IU]/ML
INJECTION, SOLUTION INTRAVENOUS; SUBCUTANEOUS
Qty: 3 ML | Refills: 5 | Status: SHIPPED | OUTPATIENT
Start: 2024-06-05

## 2024-06-05 RX ORDER — LUMATEPERONE 42 MG/1
42 CAPSULE ORAL EVERY EVENING
COMMUNITY
Start: 2024-05-16

## 2024-06-05 RX ORDER — INSULIN GLARGINE 100 [IU]/ML
INJECTION, SOLUTION SUBCUTANEOUS
Qty: 5 ADJUSTABLE DOSE PRE-FILLED PEN SYRINGE | Refills: 1 | Status: SHIPPED | OUTPATIENT
Start: 2024-06-05

## 2024-06-05 RX ORDER — TRAZODONE HYDROCHLORIDE 100 MG/1
TABLET ORAL
COMMUNITY
Start: 2024-05-15

## 2024-06-05 RX ORDER — DOXEPIN HYDROCHLORIDE 25 MG/1
CAPSULE ORAL
COMMUNITY
Start: 2024-05-15

## 2024-06-05 RX ORDER — LAMOTRIGINE 25 MG/1
TABLET ORAL
COMMUNITY
Start: 2024-04-30

## 2024-06-05 NOTE — PROGRESS NOTES
Subjective  Kevin Garza 1985 is a 39 y.o. male who presents today with:  Chief Complaint   Patient presents with    Diabetes     He monitors his blood sugars at home 3-4x daily. He reports his blood sugars average about 160, but have been as high as 500. Due for DM eye exam. He had previously seen Dr. Lyle to manage his DM, but he is not planning on returning to that office. Currently taking Humalog and Lantus as directed without adverse effects. C/O bilateral foot pain, frequent urination and excessive thirst. He does take gabapentin daily.     Hypertension       He does not monitor his BP at home. Denies chest pains, palpitations, dizziness, lightheadedness, headaches or edema. BP today in the office was 100/58. C/O SOB. Currently taking Lisinopril 5 mg once daily, as directed.        Diabetes    Hypertension      I have reviewed HPI and agree with above.     Review of Systems   All other systems reviewed and are negative.      History reviewed. No pertinent past medical history.  History reviewed. No pertinent surgical history.  Social History     Socioeconomic History    Marital status: Single     Spouse name: Not on file    Number of children: Not on file    Years of education: Not on file    Highest education level: Not on file   Occupational History    Not on file   Tobacco Use    Smoking status: Never    Smokeless tobacco: Never   Vaping Use    Vaping Use: Never used   Substance and Sexual Activity    Alcohol use: Never    Drug use: Never    Sexual activity: Not on file   Other Topics Concern    Not on file   Social History Narrative    Not on file     Social Determinants of Health     Financial Resource Strain: Low Risk  (10/20/2023)    Overall Financial Resource Strain (CARDIA)     Difficulty of Paying Living Expenses: Not hard at all   Food Insecurity: Not on file (10/20/2023)   Transportation Needs: Unknown (10/20/2023)    PRAPARE - Transportation     Lack of Transportation (Medical): Not

## 2024-07-13 DIAGNOSIS — G47.01 INSOMNIA DUE TO MEDICAL CONDITION: ICD-10-CM

## 2024-07-15 RX ORDER — QUETIAPINE FUMARATE 300 MG/1
300 TABLET, FILM COATED ORAL NIGHTLY
Qty: 90 TABLET | Refills: 1 | Status: SHIPPED | OUTPATIENT
Start: 2024-07-15

## 2024-07-15 RX ORDER — QUETIAPINE FUMARATE 50 MG/1
50 TABLET, FILM COATED ORAL NIGHTLY
Qty: 90 TABLET | Refills: 1 | Status: SHIPPED | OUTPATIENT
Start: 2024-07-15

## 2024-07-26 DIAGNOSIS — E10.65 UNCONTROLLED TYPE 1 DIABETES MELLITUS WITH HYPERGLYCEMIA, WITH LONG-TERM CURRENT USE OF INSULIN (HCC): ICD-10-CM

## 2024-07-29 RX ORDER — INSULIN GLARGINE 100 [IU]/ML
INJECTION, SOLUTION SUBCUTANEOUS
Qty: 15 ML | Refills: 1 | Status: SHIPPED | OUTPATIENT
Start: 2024-07-29

## 2024-07-29 NOTE — TELEPHONE ENCOUNTER
Pharmacy requesting medication refill. Please approve or deny this request.    Rx requested:  Requested Prescriptions     Pending Prescriptions Disp Refills    insulin glargine (BASAGLAR KWIKPEN) 100 UNIT/ML injection pen [Pharmacy Med Name: BASAGLAR 100 UNIT/ML KWIKPEN] 15 mL 1     Sig: INJECT 30 UNITS SUBCUTANEOUSLY TWICE A DAY         Last Office Visit:   6/5/2024      Next Visit Date:  Future Appointments   Date Time Provider Department Center   12/5/2024 11:00 AM Momo Molina, DO [facility] Meaghan Marcelino

## 2024-09-16 DIAGNOSIS — E10.65 UNCONTROLLED TYPE 1 DIABETES MELLITUS WITH HYPERGLYCEMIA, WITH LONG-TERM CURRENT USE OF INSULIN (HCC): ICD-10-CM

## 2024-09-16 RX ORDER — INSULIN LISPRO 100 [IU]/ML
INJECTION, SOLUTION INTRAVENOUS; SUBCUTANEOUS
Qty: 12 ML | Refills: 2 | Status: SHIPPED | OUTPATIENT
Start: 2024-09-16

## 2024-09-30 DIAGNOSIS — E10.65 UNCONTROLLED TYPE 1 DIABETES MELLITUS WITH HYPERGLYCEMIA, WITH LONG-TERM CURRENT USE OF INSULIN (HCC): ICD-10-CM

## 2024-09-30 RX ORDER — INSULIN GLARGINE 100 [IU]/ML
INJECTION, SOLUTION SUBCUTANEOUS
Qty: 15 ML | Refills: 1 | Status: SHIPPED | OUTPATIENT
Start: 2024-09-30

## 2024-09-30 NOTE — TELEPHONE ENCOUNTER
Requesting medication refill. Please approve or deny this request.    Rx requested:  Requested Prescriptions     Pending Prescriptions Disp Refills    insulin glargine (BASAGLAR KWIKPEN) 100 UNIT/ML injection pen 15 mL 1     Sig: INJECT 30 UNITS SUBCUTANEOUSLY TWICE A DAY         Last Office Visit:   6/5/2024      Next Visit Date:  Future Appointments   Date Time Provider Department Center   12/5/2024 11:00 AM Momo Molina DO [facility]

## 2024-10-02 ENCOUNTER — TELEPHONE (OUTPATIENT)
Dept: FAMILY MEDICINE CLINIC | Age: 39
End: 2024-10-02

## 2024-10-02 NOTE — TELEPHONE ENCOUNTER
Wife is calling in and stating that the pt's sugar is reading #382 at this moment.  Patient did take insulin this am but theres a chance that he picked up the wrong pen and used the wrong dose-per wife)  Asked how the patient was feeling any side effects and the only thing he would say is that he was \"off\"  He wanted to use ER.  Spoke with Arianna and was advised to use ER  Wife is taking patient to ER    KASIE

## 2024-12-09 DIAGNOSIS — E10.65 UNCONTROLLED TYPE 1 DIABETES MELLITUS WITH HYPERGLYCEMIA, WITH LONG-TERM CURRENT USE OF INSULIN (HCC): ICD-10-CM

## 2024-12-09 RX ORDER — INSULIN LISPRO 100 [IU]/ML
INJECTION, SOLUTION INTRAVENOUS; SUBCUTANEOUS
Qty: 12 ML | Refills: 2 | Status: SHIPPED | OUTPATIENT
Start: 2024-12-09

## 2024-12-10 ENCOUNTER — TELEPHONE (OUTPATIENT)
Dept: FAMILY MEDICINE CLINIC | Age: 39
End: 2024-12-10

## 2024-12-10 NOTE — TELEPHONE ENCOUNTER
Prior authorization approved  Payer: Asiya from [facility] Exchange Case ID: [id number]  Note from payer: Approved. This drug has been approved. Approved quantity: 12 units per 21 day(s). The drug has been approved from 12/10/2024 to 12/10/2025. Please call the pharmacy to process your prescription claim. Generic or biosimilar substitution may be required when available and preferred on the formulary.  Approval Details    Authorized from December 10, 2024 to December 10, 2025

## 2024-12-16 ENCOUNTER — OFFICE VISIT (OUTPATIENT)
Age: 39
End: 2024-12-16
Payer: COMMERCIAL

## 2024-12-16 VITALS
OXYGEN SATURATION: 96 % | TEMPERATURE: 98 F | SYSTOLIC BLOOD PRESSURE: 122 MMHG | HEART RATE: 76 BPM | DIASTOLIC BLOOD PRESSURE: 70 MMHG | WEIGHT: 234.8 LBS | BODY MASS INDEX: 37.73 KG/M2 | HEIGHT: 66 IN

## 2024-12-16 DIAGNOSIS — G47.01 INSOMNIA DUE TO MEDICAL CONDITION: ICD-10-CM

## 2024-12-16 DIAGNOSIS — G25.81 RESTLESS LEGS: ICD-10-CM

## 2024-12-16 DIAGNOSIS — E10.65 UNCONTROLLED TYPE 1 DIABETES MELLITUS WITH HYPERGLYCEMIA, WITH LONG-TERM CURRENT USE OF INSULIN (HCC): ICD-10-CM

## 2024-12-16 DIAGNOSIS — Z79.4 TYPE 2 DIABETES MELLITUS WITH DIABETIC POLYNEUROPATHY, WITH LONG-TERM CURRENT USE OF INSULIN (HCC): Primary | ICD-10-CM

## 2024-12-16 DIAGNOSIS — E11.42 TYPE 2 DIABETES MELLITUS WITH DIABETIC POLYNEUROPATHY, WITH LONG-TERM CURRENT USE OF INSULIN (HCC): Primary | ICD-10-CM

## 2024-12-16 LAB — HBA1C MFR BLD: 8.3 %

## 2024-12-16 PROCEDURE — 83036 HEMOGLOBIN GLYCOSYLATED A1C: CPT | Performed by: FAMILY MEDICINE

## 2024-12-16 PROCEDURE — 99213 OFFICE O/P EST LOW 20 MIN: CPT | Performed by: FAMILY MEDICINE

## 2024-12-16 RX ORDER — ROPINIROLE 2 MG/1
2 TABLET, FILM COATED ORAL 3 TIMES DAILY
Qty: 90 TABLET | Refills: 3 | Status: SHIPPED | OUTPATIENT
Start: 2024-12-16 | End: 2024-12-16

## 2024-12-16 RX ORDER — PEN NEEDLE, DIABETIC 31 GX3/16"
1 NEEDLE, DISPOSABLE MISCELLANEOUS 3 TIMES DAILY
Qty: 100 EACH | Refills: 3 | Status: SHIPPED | OUTPATIENT
Start: 2024-12-16

## 2024-12-16 RX ORDER — INSULIN LISPRO 100 [IU]/ML
INJECTION, SOLUTION INTRAVENOUS; SUBCUTANEOUS
Qty: 12 ML | Refills: 2 | Status: SHIPPED | OUTPATIENT
Start: 2024-12-16

## 2024-12-16 RX ORDER — PROCHLORPERAZINE 25 MG/1
1 SUPPOSITORY RECTAL DAILY
Qty: 1 EACH | Refills: 3 | Status: SHIPPED | OUTPATIENT
Start: 2024-12-16

## 2024-12-16 RX ORDER — QUETIAPINE FUMARATE 50 MG/1
50 TABLET, FILM COATED ORAL NIGHTLY
Qty: 90 TABLET | Refills: 1 | Status: SHIPPED | OUTPATIENT
Start: 2024-12-16

## 2024-12-16 RX ORDER — QUETIAPINE FUMARATE 300 MG/1
300 TABLET, FILM COATED ORAL NIGHTLY
Qty: 90 TABLET | Refills: 1 | Status: SHIPPED | OUTPATIENT
Start: 2024-12-16

## 2024-12-16 RX ORDER — PROCHLORPERAZINE 25 MG/1
4 SUPPOSITORY RECTAL WEEKLY
Qty: 4 EACH | Refills: 5 | Status: SHIPPED | OUTPATIENT
Start: 2024-12-16

## 2024-12-16 RX ORDER — INSULIN GLARGINE 100 [IU]/ML
INJECTION, SOLUTION SUBCUTANEOUS
Qty: 15 ML | Refills: 1 | Status: SHIPPED | OUTPATIENT
Start: 2024-12-16

## 2024-12-16 RX ORDER — PROPRANOLOL HCL 20 MG
20 TABLET ORAL NIGHTLY
Qty: 90 TABLET | Refills: 3 | Status: SHIPPED | OUTPATIENT
Start: 2024-12-16

## 2024-12-16 SDOH — ECONOMIC STABILITY: FOOD INSECURITY: WITHIN THE PAST 12 MONTHS, YOU WORRIED THAT YOUR FOOD WOULD RUN OUT BEFORE YOU GOT MONEY TO BUY MORE.: NEVER TRUE

## 2024-12-16 SDOH — ECONOMIC STABILITY: FOOD INSECURITY: WITHIN THE PAST 12 MONTHS, THE FOOD YOU BOUGHT JUST DIDN'T LAST AND YOU DIDN'T HAVE MONEY TO GET MORE.: NEVER TRUE

## 2024-12-16 SDOH — ECONOMIC STABILITY: INCOME INSECURITY: HOW HARD IS IT FOR YOU TO PAY FOR THE VERY BASICS LIKE FOOD, HOUSING, MEDICAL CARE, AND HEATING?: NOT HARD AT ALL

## 2024-12-16 NOTE — PROGRESS NOTES
completed    doxepin (SINEQUAN) 25 MG capsule Therapy completed    hydrOXYzine HCl (ATARAX) 25 MG tablet Therapy completed    Insulin Aspart FlexPen 100 UNIT/ML SOPN Therapy completed    lamoTRIgine (LAMICTAL) 25 MG tablet Therapy completed    lisinopril (PRINIVIL;ZESTRIL) 5 MG tablet Therapy completed    traZODone (DESYREL) 100 MG tablet Therapy completed    pregabalin (LYRICA) 100 MG capsule Therapy completed    Continuous Blood Gluc Sensor (DEXCOM G6 SENSOR) MISC REORDER    Continuous Blood Gluc Transmit (DEXCOM G6 TRANSMITTER) MISC REORDER    DROPLET PEN NEEDLES 31G X 5 MM MISC REORDER    rOPINIRole (REQUIP) 2 MG tablet REORDER    QUEtiapine (SEROQUEL) 300 MG tablet REORDER    QUEtiapine (SEROQUEL) 50 MG tablet REORDER    insulin glargine (BASAGLAR KWIKPEN) 100 UNIT/ML injection pen REORDER    insulin lispro, 1 Unit Dial, (HUMALOG/ADMELOG) 100 UNIT/ML SOPN REORDER    rOPINIRole (REQUIP) 2 MG tablet Therapy completed     Return in about 6 months (around 6/16/2025).        Reviewed with the patient: current clinical status, medications, activities and diet.     Side effects, adverse effects of the medication prescribed today, as well as treatment plan/ rationale and result expectations have been discussed with the patient who expresses understanding and desires to proceed.    Close follow up to evaluate treatment results and for coordination of care.  I have reviewed the patient's medical history in detail and updated the computerized patient record.    CHRISTOPHER MCDONALD DO

## 2025-01-09 ENCOUNTER — TELEPHONE (OUTPATIENT)
Age: 40
End: 2025-01-09

## 2025-01-09 DIAGNOSIS — E11.42 TYPE 2 DIABETES MELLITUS WITH DIABETIC POLYNEUROPATHY, WITH LONG-TERM CURRENT USE OF INSULIN (HCC): Primary | ICD-10-CM

## 2025-01-09 DIAGNOSIS — Z79.4 TYPE 2 DIABETES MELLITUS WITH DIABETIC POLYNEUROPATHY, WITH LONG-TERM CURRENT USE OF INSULIN (HCC): Primary | ICD-10-CM

## 2025-01-09 RX ORDER — INSULIN GLARGINE 100 [IU]/ML
30 INJECTION, SOLUTION SUBCUTANEOUS 2 TIMES DAILY
Qty: 5 ADJUSTABLE DOSE PRE-FILLED PEN SYRINGE | Refills: 3 | Status: SHIPPED | OUTPATIENT
Start: 2025-01-09

## 2025-03-11 ENCOUNTER — HOSPITAL ENCOUNTER (EMERGENCY)
Age: 40
Discharge: HOME | End: 2025-03-11
Payer: COMMERCIAL

## 2025-03-11 VITALS
OXYGEN SATURATION: 97 % | TEMPERATURE: 98 F | DIASTOLIC BLOOD PRESSURE: 109 MMHG | HEART RATE: 84 BPM | SYSTOLIC BLOOD PRESSURE: 148 MMHG

## 2025-03-11 VITALS — SYSTOLIC BLOOD PRESSURE: 156 MMHG | HEART RATE: 76 BPM | DIASTOLIC BLOOD PRESSURE: 94 MMHG

## 2025-03-11 VITALS — BODY MASS INDEX: 37.1 KG/M2

## 2025-03-11 VITALS — HEART RATE: 86 BPM | SYSTOLIC BLOOD PRESSURE: 156 MMHG | DIASTOLIC BLOOD PRESSURE: 94 MMHG

## 2025-03-11 DIAGNOSIS — R07.9: ICD-10-CM

## 2025-03-11 DIAGNOSIS — Z87.891: ICD-10-CM

## 2025-03-11 DIAGNOSIS — E86.0: Primary | ICD-10-CM

## 2025-03-11 LAB
ADD MANUAL DIFF: NO
ANION GAP: 24.5
BLOOD UREA NITROGEN: 34 MG/DL (ref 7–18)
CALCIUM: 9.5 MG/DL (ref 8.5–10.1)
CARBON DIOXIDE: 15.3 MMOL/L (ref 21–32)
CHLORIDE: 94 MMOL/L (ref 98–107)
CO2 BLD-SCNC: 15.3 MMOL/L (ref 21–32)
ESTIMATED GFR (AFRICAN AMERICA: 43
ESTIMATED GFR (NON-AFRICAN AME: 36
GLUCOSE BLD-MCNC: 243 MG/DL (ref 74–106)
HCT VFR BLD CALC: 49.7 % (ref 42–54)
HEMATOCRIT: 49.7 % (ref 42–54)
HEMOGLOBIN: 17 G/DL (ref 14–18)
IMMATURE GRANULOCYTES ABS AUTO: 0.02 10^3/UL (ref 0–0.03)
IMMATURE GRANULOCYTES PCT AUTO: 0.2 % (ref 0–0.5)
LYMPHOCYTES  ABSOLUTE AUTO: 0.9 10^3/UL (ref 1.2–3.8)
MCV RBC: 85.1 FL (ref 80–94)
MEAN CORPUSCULAR HEMOGLOBIN: 29.1 PG (ref 25.9–34)
MEAN CORPUSCULAR HGB CONC: 34.2 G/DL (ref 29.9–35.2)
MEAN CORPUSCULAR VOLUME: 85.1 FL (ref 80–94)
PLATELET # BLD: 308 10^3/UL (ref 150–450)
PLATELET COUNT: 308 10^3/UL (ref 150–450)
POTASSIUM SERPLBLD-SCNC: 5.8 MMOL/L (ref 3.5–5.1)
POTASSIUM: 5.8 MMOL/L (ref 3.5–5.1)
RED BLOOD COUNT: 5.84 10^6/UL (ref 4.7–6.1)
SODIUM BLD-SCNC: 128 MMOL/L (ref 136–145)
SODIUM: 128 MMOL/L (ref 136–145)
WBC # BLD: 8.6 10^3/UL (ref 4–11)
WHITE BLOOD COUNT: 8.6 10^3/UL (ref 4–11)

## 2025-03-11 PROCEDURE — 71045 X-RAY EXAM CHEST 1 VIEW: CPT

## 2025-03-11 PROCEDURE — 93005 ELECTROCARDIOGRAM TRACING: CPT

## 2025-03-11 PROCEDURE — 96374 THER/PROPH/DIAG INJ IV PUSH: CPT

## 2025-03-11 PROCEDURE — 99285 EMERGENCY DEPT VISIT HI MDM: CPT

## 2025-03-11 PROCEDURE — 85025 COMPLETE CBC W/AUTO DIFF WBC: CPT

## 2025-03-11 PROCEDURE — 96361 HYDRATE IV INFUSION ADD-ON: CPT

## 2025-03-11 PROCEDURE — 80048 BASIC METABOLIC PNL TOTAL CA: CPT

## 2025-03-11 PROCEDURE — 84484 ASSAY OF TROPONIN QUANT: CPT

## 2025-03-11 PROCEDURE — 36415 COLL VENOUS BLD VENIPUNCTURE: CPT

## 2025-03-20 DIAGNOSIS — E10.65 UNCONTROLLED TYPE 1 DIABETES MELLITUS WITH HYPERGLYCEMIA, WITH LONG-TERM CURRENT USE OF INSULIN (HCC): ICD-10-CM

## 2025-03-20 RX ORDER — INSULIN LISPRO 100 [IU]/ML
INJECTION, SOLUTION INTRAVENOUS; SUBCUTANEOUS
Qty: 12 ML | Refills: 2 | Status: SHIPPED | OUTPATIENT
Start: 2025-03-20 | End: 2025-03-21 | Stop reason: SDUPTHER

## 2025-03-20 NOTE — TELEPHONE ENCOUNTER
Amanda (on PHI) called office requesting Rx for   insulin lispro, 1 Unit Dial, (HUMALOG/ADMELOG) 100 UNIT/ML SOPN    90 day supply FAXED to [facility] and pharmacy is requesting to fax Rx to   Fax: [phone] due to computer not accepting/recognizing refills at this time.

## 2025-03-21 DIAGNOSIS — E10.65 UNCONTROLLED TYPE 1 DIABETES MELLITUS WITH HYPERGLYCEMIA, WITH LONG-TERM CURRENT USE OF INSULIN (HCC): ICD-10-CM

## 2025-03-21 RX ORDER — INSULIN LISPRO 100 [IU]/ML
INJECTION, SOLUTION INTRAVENOUS; SUBCUTANEOUS
Qty: 12 ML | Refills: 0 | Status: SHIPPED | OUTPATIENT
Start: 2025-03-21

## 2025-03-21 NOTE — TELEPHONE ENCOUNTER
This script was suppose to go to [facility] pharmacy in [place] as requested by patient.     It was sent to the wrong pharmacy. Patient needs his insulin.    Is there someone who can please send this medication for the patient?

## 2025-04-16 DIAGNOSIS — E11.42 TYPE 2 DIABETES MELLITUS WITH DIABETIC POLYNEUROPATHY, WITH LONG-TERM CURRENT USE OF INSULIN (HCC): ICD-10-CM

## 2025-04-16 DIAGNOSIS — Z79.4 TYPE 2 DIABETES MELLITUS WITH DIABETIC POLYNEUROPATHY, WITH LONG-TERM CURRENT USE OF INSULIN (HCC): ICD-10-CM

## 2025-04-16 DIAGNOSIS — E10.65 UNCONTROLLED TYPE 1 DIABETES MELLITUS WITH HYPERGLYCEMIA, WITH LONG-TERM CURRENT USE OF INSULIN (HCC): ICD-10-CM

## 2025-04-16 RX ORDER — INSULIN LISPRO 100 [IU]/ML
INJECTION, SOLUTION INTRAVENOUS; SUBCUTANEOUS
Qty: 12 ML | Refills: 0 | Status: SHIPPED | OUTPATIENT
Start: 2025-04-16

## 2025-04-16 RX ORDER — INSULIN GLARGINE 100 [IU]/ML
30 INJECTION, SOLUTION SUBCUTANEOUS 2 TIMES DAILY
Qty: 5 ADJUSTABLE DOSE PRE-FILLED PEN SYRINGE | Refills: 0 | Status: SHIPPED | OUTPATIENT
Start: 2025-04-16

## 2025-04-17 ENCOUNTER — TELEPHONE (OUTPATIENT)
Age: 40
End: 2025-04-17

## 2025-04-17 NOTE — TELEPHONE ENCOUNTER
[facility]. The patient's insurance will cover Lantus Solastar. Is it ok to make that change?      Please advise.

## 2025-05-29 DIAGNOSIS — E10.65 UNCONTROLLED TYPE 1 DIABETES MELLITUS WITH HYPERGLYCEMIA, WITH LONG-TERM CURRENT USE OF INSULIN (HCC): ICD-10-CM

## 2025-05-29 RX ORDER — INSULIN LISPRO 100 [IU]/ML
INJECTION, SOLUTION INTRAVENOUS; SUBCUTANEOUS
Qty: 12 ML | Refills: 0 | Status: SHIPPED | OUTPATIENT
Start: 2025-05-29

## 2025-06-16 ENCOUNTER — OFFICE VISIT (OUTPATIENT)
Age: 40
End: 2025-06-16
Payer: COMMERCIAL

## 2025-06-16 VITALS
HEIGHT: 66 IN | HEART RATE: 68 BPM | DIASTOLIC BLOOD PRESSURE: 72 MMHG | BODY MASS INDEX: 38.83 KG/M2 | OXYGEN SATURATION: 99 % | SYSTOLIC BLOOD PRESSURE: 128 MMHG | TEMPERATURE: 97.9 F | WEIGHT: 241.6 LBS

## 2025-06-16 DIAGNOSIS — E10.65 UNCONTROLLED TYPE 1 DIABETES MELLITUS WITH HYPERGLYCEMIA, WITH LONG-TERM CURRENT USE OF INSULIN (HCC): ICD-10-CM

## 2025-06-16 DIAGNOSIS — G47.01 INSOMNIA DUE TO MEDICAL CONDITION: ICD-10-CM

## 2025-06-16 DIAGNOSIS — E11.42 TYPE 2 DIABETES MELLITUS WITH DIABETIC POLYNEUROPATHY, WITH LONG-TERM CURRENT USE OF INSULIN (HCC): Primary | ICD-10-CM

## 2025-06-16 DIAGNOSIS — M79.2 CHRONIC NEUROPATHIC PAIN: ICD-10-CM

## 2025-06-16 DIAGNOSIS — G89.29 CHRONIC NEUROPATHIC PAIN: ICD-10-CM

## 2025-06-16 DIAGNOSIS — Z79.4 TYPE 2 DIABETES MELLITUS WITH DIABETIC POLYNEUROPATHY, WITH LONG-TERM CURRENT USE OF INSULIN (HCC): Primary | ICD-10-CM

## 2025-06-16 PROCEDURE — 3046F HEMOGLOBIN A1C LEVEL >9.0%: CPT | Performed by: FAMILY MEDICINE

## 2025-06-16 PROCEDURE — 1036F TOBACCO NON-USER: CPT | Performed by: FAMILY MEDICINE

## 2025-06-16 PROCEDURE — G8417 CALC BMI ABV UP PARAM F/U: HCPCS | Performed by: FAMILY MEDICINE

## 2025-06-16 PROCEDURE — 2022F DILAT RTA XM EVC RTNOPTHY: CPT | Performed by: FAMILY MEDICINE

## 2025-06-16 PROCEDURE — 99214 OFFICE O/P EST MOD 30 MIN: CPT | Performed by: FAMILY MEDICINE

## 2025-06-16 PROCEDURE — 99213 OFFICE O/P EST LOW 20 MIN: CPT | Performed by: FAMILY MEDICINE

## 2025-06-16 PROCEDURE — G8427 DOCREV CUR MEDS BY ELIG CLIN: HCPCS | Performed by: FAMILY MEDICINE

## 2025-06-16 RX ORDER — INSULIN GLARGINE 100 [IU]/ML
30 INJECTION, SOLUTION SUBCUTANEOUS 2 TIMES DAILY
Qty: 4 ADJUSTABLE DOSE PRE-FILLED PEN SYRINGE | Refills: 11 | Status: SHIPPED | OUTPATIENT
Start: 2025-06-16

## 2025-06-16 RX ORDER — PEN NEEDLE, DIABETIC 32GX 5/32"
1 NEEDLE, DISPOSABLE MISCELLANEOUS 3 TIMES DAILY
Qty: 100 EACH | Refills: 3 | Status: SHIPPED | OUTPATIENT
Start: 2025-06-16

## 2025-06-16 RX ORDER — QUETIAPINE FUMARATE 300 MG/1
300 TABLET, FILM COATED ORAL NIGHTLY
Qty: 90 TABLET | Refills: 1 | Status: SHIPPED | OUTPATIENT
Start: 2025-06-16

## 2025-06-16 RX ORDER — QUETIAPINE FUMARATE 50 MG/1
50 TABLET, FILM COATED ORAL NIGHTLY
Qty: 90 TABLET | Refills: 1 | Status: SHIPPED | OUTPATIENT
Start: 2025-06-16

## 2025-06-16 RX ORDER — INSULIN LISPRO 100 [IU]/ML
INJECTION, SOLUTION INTRAVENOUS; SUBCUTANEOUS
Qty: 12 ML | Refills: 11 | Status: SHIPPED | OUTPATIENT
Start: 2025-06-16

## 2025-06-16 SDOH — ECONOMIC STABILITY: FOOD INSECURITY: WITHIN THE PAST 12 MONTHS, THE FOOD YOU BOUGHT JUST DIDN'T LAST AND YOU DIDN'T HAVE MONEY TO GET MORE.: NEVER TRUE

## 2025-06-16 SDOH — ECONOMIC STABILITY: FOOD INSECURITY: WITHIN THE PAST 12 MONTHS, YOU WORRIED THAT YOUR FOOD WOULD RUN OUT BEFORE YOU GOT MONEY TO BUY MORE.: NEVER TRUE

## 2025-06-16 ASSESSMENT — PATIENT HEALTH QUESTIONNAIRE - PHQ9
2. FEELING DOWN, DEPRESSED OR HOPELESS: NOT AT ALL
SUM OF ALL RESPONSES TO PHQ QUESTIONS 1-9: 0
1. LITTLE INTEREST OR PLEASURE IN DOING THINGS: NOT AT ALL
SUM OF ALL RESPONSES TO PHQ QUESTIONS 1-9: 0

## 2025-06-16 NOTE — PROGRESS NOTES
Kevin Garza (:  1985) is a 40 y.o. male, Established patient, here for evaluation of the following chief complaint(s):  Diabetes (He monitors his blood sugars at home 3-4x daily. He reports his blood sugars average about 170. C/O bilateral foot pain, frequent urination and excessive thirst. He does take gabapentin daily. ), Hypertension (He does not monitor his BP at home. Denies chest pains, palpitations, dizziness, lightheadedness, SOB, headaches or edema.), Depression (He follows with Eden De La Cruz CNP for  concerns./), Health Maintenance (Yearly labs drawn last on 24.), and Dysuria (C/O weak urine stream/dribbling. No hx PSA screening.)          Subjective   History of Present Illness      History reviewed. No pertinent past medical history.  History reviewed. No pertinent surgical history.  Social History     Socioeconomic History    Marital status: Single     Spouse name: Not on file    Number of children: Not on file    Years of education: Not on file    Highest education level: Not on file   Occupational History    Not on file   Tobacco Use    Smoking status: Never    Smokeless tobacco: Never   Vaping Use    Vaping status: Never Used   Substance and Sexual Activity    Alcohol use: Never    Drug use: Never    Sexual activity: Not on file   Other Topics Concern    Not on file   Social History Narrative    Not on file     Social Drivers of Health     Financial Resource Strain: Low Risk  (2024)    Overall Financial Resource Strain (CARDIA)     Difficulty of Paying Living Expenses: Not hard at all   Food Insecurity: No Food Insecurity (2025)    Hunger Vital Sign     Worried About Running Out of Food in the Last Year: Never true     Ran Out of Food in the Last Year: Never true   Transportation Needs: No Transportation Needs (2025)    PRAPARE - Transportation     Lack of Transportation (Medical): No     Lack of Transportation (Non-Medical): No   Physical Activity: Not on file

## 2025-06-16 NOTE — PROGRESS NOTES
Subjective  Kevin Garza 1985 is a 40 y.o. male who presents today with:  Chief Complaint   Patient presents with    Diabetes     He monitors his blood sugars at home 3-4x daily. He reports his blood sugars average about 170. C/O bilateral foot pain, frequent urination and excessive thirst. He does take gabapentin daily.     Hypertension     He does not monitor his BP at home. Denies chest pains, palpitations, dizziness, lightheadedness, SOB, headaches or edema.    Depression     He follows with Eden De La Cruz CNP for  concerns.      Health Maintenance     Yearly labs drawn last on 12/16/24.    Dysuria     C/O weak urine stream/dribbling. No hx PSA screening.       Diabetes    Hypertension    Depression  Dysuria       I have reviewed HPI and agree with above.     Review of Systems   Genitourinary:  Positive for dysuria.   Psychiatric/Behavioral:  Positive for depression.    All other systems reviewed and are negative.      History reviewed. No pertinent past medical history.  History reviewed. No pertinent surgical history.  Social History     Socioeconomic History    Marital status: Single     Spouse name: Not on file    Number of children: Not on file    Years of education: Not on file    Highest education level: Not on file   Occupational History    Not on file   Tobacco Use    Smoking status: Never    Smokeless tobacco: Never   Vaping Use    Vaping status: Never Used   Substance and Sexual Activity    Alcohol use: Never    Drug use: Never    Sexual activity: Not on file   Other Topics Concern    Not on file   Social History Narrative    Not on file     Social Drivers of Health     Financial Resource Strain: Low Risk  (12/16/2024)    Overall Financial Resource Strain (CARDIA)     Difficulty of Paying Living Expenses: Not hard at all   Food Insecurity: No Food Insecurity (6/16/2025)    Hunger Vital Sign     Worried About Running Out of Food in the Last Year: Never true     Ran Out of Food in the Last

## 2025-07-28 DIAGNOSIS — Z79.4 TYPE 2 DIABETES MELLITUS WITH DIABETIC POLYNEUROPATHY, WITH LONG-TERM CURRENT USE OF INSULIN (HCC): ICD-10-CM

## 2025-07-28 DIAGNOSIS — E11.42 TYPE 2 DIABETES MELLITUS WITH DIABETIC POLYNEUROPATHY, WITH LONG-TERM CURRENT USE OF INSULIN (HCC): ICD-10-CM

## 2025-07-28 RX ORDER — PEN NEEDLE, DIABETIC 32GX 5/32"
1 NEEDLE, DISPOSABLE MISCELLANEOUS
Qty: 200 EACH | Refills: 3 | Status: SHIPPED | OUTPATIENT
Start: 2025-07-28

## 2025-09-02 ENCOUNTER — TELEPHONE (OUTPATIENT)
Age: 40
End: 2025-09-02

## 2025-09-03 RX ORDER — INSULIN GLARGINE 100 [IU]/ML
30 INJECTION, SOLUTION SUBCUTANEOUS NIGHTLY
Qty: 5 ADJUSTABLE DOSE PRE-FILLED PEN SYRINGE | Refills: 3 | Status: SHIPPED | OUTPATIENT
Start: 2025-09-03 | End: 2025-09-05 | Stop reason: SDUPTHER

## 2025-09-04 ENCOUNTER — TELEPHONE (OUTPATIENT)
Age: 40
End: 2025-09-04

## 2025-09-05 RX ORDER — INSULIN GLARGINE 100 [IU]/ML
30 INJECTION, SOLUTION SUBCUTANEOUS 2 TIMES DAILY
Qty: 15 ADJUSTABLE DOSE PRE-FILLED PEN SYRINGE | Refills: 5 | Status: SHIPPED | OUTPATIENT
Start: 2025-09-05